# Patient Record
Sex: MALE | Race: ASIAN | NOT HISPANIC OR LATINO | URBAN - METROPOLITAN AREA
[De-identification: names, ages, dates, MRNs, and addresses within clinical notes are randomized per-mention and may not be internally consistent; named-entity substitution may affect disease eponyms.]

---

## 2022-07-13 ENCOUNTER — APPOINTMENT (OUTPATIENT)
Age: 52
Setting detail: DERMATOLOGY
End: 2022-07-13

## 2022-07-13 VITALS — TEMPERATURE: 98 F

## 2022-07-13 DIAGNOSIS — L50.8 OTHER URTICARIA: ICD-10-CM

## 2022-07-13 PROCEDURE — OTHER TREATMENT REGIMEN: OTHER

## 2022-07-13 PROCEDURE — OTHER PRESCRIPTION: OTHER

## 2022-07-13 PROCEDURE — OTHER MIPS QUALITY: OTHER

## 2022-07-13 PROCEDURE — OTHER COUNSELING: OTHER

## 2022-07-13 PROCEDURE — 99203 OFFICE O/P NEW LOW 30 MIN: CPT

## 2022-07-13 RX ORDER — HYDROXYZINE HYDROCHLORIDE 50 MG/1
TABLET, FILM COATED ORAL
Qty: 30 | Refills: 0 | Status: ERX

## 2022-07-13 NOTE — HPI: RASH
What Type Of Note Output Would You Prefer (Optional)?: Bullet Format
How Severe Is Your Rash?: moderate
Is This A New Presentation, Or A Follow-Up?: Rash
Additional History: Previously completed 2 courses of oral steroids , Hydroxyzine + topical steroids. \\nLiposuction performed 8 weeks ago. \\nRash typically goes away within 1-2 days.

## 2022-07-13 NOTE — PROCEDURE: TREATMENT REGIMEN
Continue Regimen: - TAC 0.1% : AAA bid x 2 weeks/month\\n- Cetirizine : Take 1 tab PO QAM \\n- Hydroxyzine 50mg : Take 1 tab PO QHS PRN itching
Otc Regimen: - Xyzal : Take 1 tab PO QD/PRN
NEIL Alejandre
Plan: HX of seasonal allergies \\n- Discussed triggers including: heat, sunlight and environment\\n- Patient states having cosmetic procedure (lipo) at the end of May then rash appeared \\n- Patient denies needing additional refills on topical steroids \\n- Instructed patient to reach out to  who performed lipo \\n- Will send in additional refills of Hydroxyzine \\n- RTC 3 weeks
Detail Level: Zone

## 2022-07-13 NOTE — PROCEDURE: MIPS QUALITY
Quality 402: Tobacco Use And Help With Quitting Among Adolescents: Patient screened for tobacco and never smoked
Quality 226: Preventive Care And Screening: Tobacco Use: Screening And Cessation Intervention: Patient screened for tobacco use and is an ex/non-smoker
Quality 130: Documentation Of Current Medications In The Medical Record: Current Medications Documented
Quality 110: Preventive Care And Screening: Influenza Immunization: Influenza immunization was not ordered or administered, reason not given
Detail Level: Detailed